# Patient Record
Sex: FEMALE | URBAN - METROPOLITAN AREA
[De-identification: names, ages, dates, MRNs, and addresses within clinical notes are randomized per-mention and may not be internally consistent; named-entity substitution may affect disease eponyms.]

---

## 2020-05-13 LAB
ANTIBODY SCREEN, EXTERNAL: NORMAL
CHLAMYDIA, EXTERNAL: NORMAL
HBSAG, EXTERNAL: NEGATIVE
HIV, EXTERNAL: NORMAL
N. GONORRHEA, EXTERNAL: NORMAL
RPR, EXTERNAL: NORMAL
RUBELLA, EXTERNAL: NORMAL
TYPE, ABO & RH, EXTERNAL: NORMAL

## 2020-10-02 LAB — GRBS, EXTERNAL: NORMAL

## 2020-10-21 ENCOUNTER — HOSPITAL ENCOUNTER (OUTPATIENT)
Age: 35
Discharge: HOME | DRG: 540 | End: 2020-10-22
Attending: OBSTETRICS & GYNECOLOGY | Admitting: OBSTETRICS & GYNECOLOGY
Payer: MEDICAID

## 2020-10-21 PROBLEM — Z33.1 IUP (INTRAUTERINE PREGNANCY), INCIDENTAL: Status: ACTIVE | Noted: 2020-10-21

## 2020-10-21 LAB
ALBUMIN SERPL-MCNC: 2.6 G/DL (ref 3.4–5)
ALBUMIN/GLOB SERPL: 0.7 {RATIO} (ref 0.8–1.7)
ALP SERPL-CCNC: 160 U/L (ref 45–117)
ALT SERPL-CCNC: 15 U/L (ref 13–56)
ANION GAP SERPL CALC-SCNC: 8 MMOL/L (ref 3–18)
APPEARANCE UR: ABNORMAL
AST SERPL-CCNC: 13 U/L (ref 10–38)
BILIRUB SERPL-MCNC: 0.3 MG/DL (ref 0.2–1)
BILIRUB UR QL: NEGATIVE
BUN SERPL-MCNC: 6 MG/DL (ref 7–18)
BUN/CREAT SERPL: 9 (ref 12–20)
CALCIUM SERPL-MCNC: 9.3 MG/DL (ref 8.5–10.1)
CHLORIDE SERPL-SCNC: 108 MMOL/L (ref 100–111)
CO2 SERPL-SCNC: 23 MMOL/L (ref 21–32)
COLOR UR: ABNORMAL
CREAT SERPL-MCNC: 0.64 MG/DL (ref 0.6–1.3)
CREAT UR-MCNC: 50 MG/DL (ref 30–125)
ERYTHROCYTE [DISTWIDTH] IN BLOOD BY AUTOMATED COUNT: 15.1 % (ref 11.6–14.5)
GLOBULIN SER CALC-MCNC: 3.9 G/DL (ref 2–4)
GLUCOSE SERPL-MCNC: 111 MG/DL (ref 74–99)
GLUCOSE UR QL STRIP.AUTO: NEGATIVE MG/DL
HCT VFR BLD AUTO: 36.7 % (ref 35–45)
HGB BLD-MCNC: 12.1 G/DL (ref 12–16)
KETONES UR-MCNC: NEGATIVE MG/DL
LEUKOCYTE ESTERASE UR QL STRIP: ABNORMAL
MCH RBC QN AUTO: 25.7 PG (ref 24–34)
MCHC RBC AUTO-ENTMCNC: 33 G/DL (ref 31–37)
MCV RBC AUTO: 78.1 FL (ref 74–97)
NITRITE UR QL: NEGATIVE
PH UR: 6 [PH] (ref 5–9)
PLATELET # BLD AUTO: 349 K/UL (ref 135–420)
PMV BLD AUTO: 10.5 FL (ref 9.2–11.8)
POTASSIUM SERPL-SCNC: 4 MMOL/L (ref 3.5–5.5)
PROT SERPL-MCNC: 6.5 G/DL (ref 6.4–8.2)
PROT UR QL: NEGATIVE MG/DL
PROT UR-MCNC: 8 MG/DL
PROT/CREAT UR-RTO: 0.2
RBC # BLD AUTO: 4.7 M/UL (ref 4.2–5.3)
RBC # UR STRIP: ABNORMAL /UL
SERVICE CMNT-IMP: ABNORMAL
SODIUM SERPL-SCNC: 139 MMOL/L (ref 136–145)
SP GR UR: 1.01 (ref 1–1.02)
URATE SERPL-MCNC: 3.6 MG/DL (ref 2.6–7.2)
UROBILINOGEN UR QL: 1 EU/DL (ref 0.2–1)
WBC # BLD AUTO: 13.6 K/UL (ref 4.6–13.2)

## 2020-10-21 PROCEDURE — 83615 LACTATE (LD) (LDH) ENZYME: CPT

## 2020-10-21 PROCEDURE — 96360 HYDRATION IV INFUSION INIT: CPT

## 2020-10-21 PROCEDURE — 80053 COMPREHEN METABOLIC PANEL: CPT

## 2020-10-21 PROCEDURE — 85027 COMPLETE CBC AUTOMATED: CPT

## 2020-10-21 PROCEDURE — 74011250636 HC RX REV CODE- 250/636: Performed by: MIDWIFE

## 2020-10-21 PROCEDURE — 99285 EMERGENCY DEPT VISIT HI MDM: CPT

## 2020-10-21 PROCEDURE — 81003 URINALYSIS AUTO W/O SCOPE: CPT

## 2020-10-21 PROCEDURE — 59025 FETAL NON-STRESS TEST: CPT

## 2020-10-21 PROCEDURE — 96361 HYDRATE IV INFUSION ADD-ON: CPT

## 2020-10-21 PROCEDURE — 84550 ASSAY OF BLOOD/URIC ACID: CPT

## 2020-10-21 PROCEDURE — 84156 ASSAY OF PROTEIN URINE: CPT

## 2020-10-21 RX ORDER — MELATONIN
1000 DAILY
COMMUNITY

## 2020-10-21 RX ORDER — LANOLIN ALCOHOL/MO/W.PET/CERES
325 CREAM (GRAM) TOPICAL
COMMUNITY

## 2020-10-21 RX ORDER — SODIUM CHLORIDE, SODIUM LACTATE, POTASSIUM CHLORIDE, CALCIUM CHLORIDE 600; 310; 30; 20 MG/100ML; MG/100ML; MG/100ML; MG/100ML
125 INJECTION, SOLUTION INTRAVENOUS CONTINUOUS
Status: DISCONTINUED | OUTPATIENT
Start: 2020-10-21 | End: 2020-10-22 | Stop reason: HOSPADM

## 2020-10-21 RX ADMIN — SODIUM CHLORIDE, SODIUM LACTATE, POTASSIUM CHLORIDE, AND CALCIUM CHLORIDE 125 ML/HR: 600; 310; 30; 20 INJECTION, SOLUTION INTRAVENOUS at 23:31

## 2020-10-21 RX ADMIN — SODIUM CHLORIDE, SODIUM LACTATE, POTASSIUM CHLORIDE, AND CALCIUM CHLORIDE 500 ML: 600; 310; 30; 20 INJECTION, SOLUTION INTRAVENOUS at 22:52

## 2020-10-22 ENCOUNTER — HOSPITAL ENCOUNTER (INPATIENT)
Age: 35
LOS: 3 days | Discharge: HOME | DRG: 540 | End: 2020-10-25
Attending: OBSTETRICS & GYNECOLOGY | Admitting: OBSTETRICS & GYNECOLOGY
Payer: MEDICAID

## 2020-10-22 ENCOUNTER — ANESTHESIA EVENT (OUTPATIENT)
Dept: LABOR AND DELIVERY | Age: 35
DRG: 540 | End: 2020-10-22
Payer: MEDICAID

## 2020-10-22 ENCOUNTER — ANESTHESIA EVENT (OUTPATIENT)
Dept: LABOR AND DELIVERY | Age: 35
End: 2020-10-22

## 2020-10-22 ENCOUNTER — ANESTHESIA (OUTPATIENT)
Dept: LABOR AND DELIVERY | Age: 35
DRG: 540 | End: 2020-10-22
Payer: MEDICAID

## 2020-10-22 VITALS
HEART RATE: 99 BPM | WEIGHT: 210 LBS | DIASTOLIC BLOOD PRESSURE: 82 MMHG | BODY MASS INDEX: 35.85 KG/M2 | RESPIRATION RATE: 16 BRPM | OXYGEN SATURATION: 100 % | HEIGHT: 64 IN | TEMPERATURE: 99.4 F | SYSTOLIC BLOOD PRESSURE: 146 MMHG

## 2020-10-22 DIAGNOSIS — Z98.891 S/P REPEAT LOW TRANSVERSE C-SECTION: Primary | ICD-10-CM

## 2020-10-22 PROBLEM — Z34.90 PREGNANCY: Status: ACTIVE | Noted: 2020-10-22

## 2020-10-22 PROBLEM — Z3A.38 38 WEEKS GESTATION OF PREGNANCY: Status: ACTIVE | Noted: 2020-10-22

## 2020-10-22 LAB
ABO + RH BLD: NORMAL
APPEARANCE UR: CLEAR
BASOPHILS # BLD: 0 K/UL (ref 0–0.1)
BASOPHILS NFR BLD: 0 % (ref 0–2)
BILIRUB UR QL: NEGATIVE
BLOOD GROUP ANTIBODIES SERPL: NORMAL
COLOR UR: YELLOW
DIFFERENTIAL METHOD BLD: ABNORMAL
EOSINOPHIL # BLD: 0.1 K/UL (ref 0–0.4)
EOSINOPHIL NFR BLD: 1 % (ref 0–5)
ERYTHROCYTE [DISTWIDTH] IN BLOOD BY AUTOMATED COUNT: 15 % (ref 11.6–14.5)
GLUCOSE UR QL STRIP.AUTO: NEGATIVE MG/DL
HCT VFR BLD AUTO: 36.8 % (ref 35–45)
HGB BLD-MCNC: 12.1 G/DL (ref 12–16)
KETONES UR-MCNC: NEGATIVE MG/DL
LDH SERPL L TO P-CCNC: 181 U/L (ref 81–234)
LEUKOCYTE ESTERASE UR QL STRIP: ABNORMAL
LYMPHOCYTES # BLD: 1.9 K/UL (ref 0.9–3.6)
LYMPHOCYTES NFR BLD: 14 % (ref 21–52)
MCH RBC QN AUTO: 26 PG (ref 24–34)
MCHC RBC AUTO-ENTMCNC: 32.9 G/DL (ref 31–37)
MCV RBC AUTO: 79 FL (ref 74–97)
MONOCYTES # BLD: 0.9 K/UL (ref 0.05–1.2)
MONOCYTES NFR BLD: 6 % (ref 3–10)
NEUTS SEG # BLD: 11.1 K/UL (ref 1.8–8)
NEUTS SEG NFR BLD: 79 % (ref 40–73)
NITRITE UR QL: NEGATIVE
PH UR: 6.5 [PH] (ref 5–9)
PLATELET # BLD AUTO: 331 K/UL (ref 135–420)
PMV BLD AUTO: 10.3 FL (ref 9.2–11.8)
PROT UR QL: NEGATIVE MG/DL
RBC # BLD AUTO: 4.66 M/UL (ref 4.2–5.3)
RBC # UR STRIP: ABNORMAL /UL
SERVICE CMNT-IMP: ABNORMAL
SP GR UR: 1.01 (ref 1–1.02)
SPECIMEN EXP DATE BLD: NORMAL
UROBILINOGEN UR QL: 0.2 EU/DL (ref 0.2–1)
WBC # BLD AUTO: 13.9 K/UL (ref 4.6–13.2)

## 2020-10-22 PROCEDURE — 77030011265 HC ELECTRD BLD HEX COVD -A: Performed by: OBSTETRICS & GYNECOLOGY

## 2020-10-22 PROCEDURE — 75410000003 HC RECOV DEL/VAG/CSECN EA 0.5 HR

## 2020-10-22 PROCEDURE — 75410000003 HC RECOV DEL/VAG/CSECN EA 0.5 HR: Performed by: OBSTETRICS & GYNECOLOGY

## 2020-10-22 PROCEDURE — 76010000392 HC C SECN EA ADDL 0.5 HR: Performed by: OBSTETRICS & GYNECOLOGY

## 2020-10-22 PROCEDURE — 96361 HYDRATE IV INFUSION ADD-ON: CPT

## 2020-10-22 PROCEDURE — 65270000029 HC RM PRIVATE

## 2020-10-22 PROCEDURE — 99283 EMERGENCY DEPT VISIT LOW MDM: CPT

## 2020-10-22 PROCEDURE — 86900 BLOOD TYPING SEROLOGIC ABO: CPT

## 2020-10-22 PROCEDURE — 74011250636 HC RX REV CODE- 250/636: Performed by: OBSTETRICS & GYNECOLOGY

## 2020-10-22 PROCEDURE — 77030002916 HC SUT ETHLN J&J -A: Performed by: OBSTETRICS & GYNECOLOGY

## 2020-10-22 PROCEDURE — 77030018809 HC RETRCTR ALXSO DISP AMR -B: Performed by: OBSTETRICS & GYNECOLOGY

## 2020-10-22 PROCEDURE — 74011250636 HC RX REV CODE- 250/636: Performed by: ADVANCED PRACTICE MIDWIFE

## 2020-10-22 PROCEDURE — 74011250636 HC RX REV CODE- 250/636: Performed by: NURSE ANESTHETIST, CERTIFIED REGISTERED

## 2020-10-22 PROCEDURE — 2709999900 HC NON-CHARGEABLE SUPPLY: Performed by: OBSTETRICS & GYNECOLOGY

## 2020-10-22 PROCEDURE — 77030040361 HC SLV COMPR DVT MDII -B

## 2020-10-22 PROCEDURE — 99285 EMERGENCY DEPT VISIT HI MDM: CPT

## 2020-10-22 PROCEDURE — 74011000250 HC RX REV CODE- 250: Performed by: NURSE ANESTHETIST, CERTIFIED REGISTERED

## 2020-10-22 PROCEDURE — 77030002933 HC SUT MCRYL J&J -A: Performed by: OBSTETRICS & GYNECOLOGY

## 2020-10-22 PROCEDURE — 77030014144 HC TY SPN ANES BBMI -B: Performed by: NURSE ANESTHETIST, CERTIFIED REGISTERED

## 2020-10-22 PROCEDURE — 77030040830 HC CATH URETH FOL MDII -A

## 2020-10-22 PROCEDURE — 59025 FETAL NON-STRESS TEST: CPT

## 2020-10-22 PROCEDURE — 96360 HYDRATION IV INFUSION INIT: CPT

## 2020-10-22 PROCEDURE — 77030040361 HC SLV COMPR DVT MDII -B: Performed by: OBSTETRICS & GYNECOLOGY

## 2020-10-22 PROCEDURE — 77030031139 HC SUT VCRL2 J&J -A: Performed by: OBSTETRICS & GYNECOLOGY

## 2020-10-22 PROCEDURE — 76060000033 HC ANESTHESIA 1 TO 1.5 HR: Performed by: OBSTETRICS & GYNECOLOGY

## 2020-10-22 PROCEDURE — 81003 URINALYSIS AUTO W/O SCOPE: CPT

## 2020-10-22 PROCEDURE — 74011250637 HC RX REV CODE- 250/637

## 2020-10-22 PROCEDURE — 76010000391 HC C SECN FIRST 1 HR: Performed by: OBSTETRICS & GYNECOLOGY

## 2020-10-22 PROCEDURE — 85025 COMPLETE CBC W/AUTO DIFF WBC: CPT

## 2020-10-22 RX ORDER — NALOXONE HYDROCHLORIDE 0.4 MG/ML
0.1 INJECTION, SOLUTION INTRAMUSCULAR; INTRAVENOUS; SUBCUTANEOUS AS NEEDED
Status: DISCONTINUED | OUTPATIENT
Start: 2020-10-22 | End: 2020-10-25 | Stop reason: HOSPADM

## 2020-10-22 RX ORDER — HYDROMORPHONE HCL IN WATER/PF 1 MG/ML
SYRINGE (ML) INJECTION CONTINUOUS
Status: DISCONTINUED | OUTPATIENT
Start: 2020-10-22 | End: 2020-10-22

## 2020-10-22 RX ORDER — SODIUM CHLORIDE 0.9 % (FLUSH) 0.9 %
5-40 SYRINGE (ML) INJECTION AS NEEDED
Status: DISCONTINUED | OUTPATIENT
Start: 2020-10-22 | End: 2020-10-24 | Stop reason: SDUPTHER

## 2020-10-22 RX ORDER — OXYTOCIN/RINGER'S LACTATE 30/500 ML
10 PLASTIC BAG, INJECTION (ML) INTRAVENOUS AS NEEDED
Status: DISCONTINUED | OUTPATIENT
Start: 2020-10-22 | End: 2020-10-25 | Stop reason: HOSPADM

## 2020-10-22 RX ORDER — ACETAMINOPHEN 325 MG/1
650 TABLET ORAL
Status: DISCONTINUED | OUTPATIENT
Start: 2020-10-22 | End: 2020-10-22 | Stop reason: SDUPTHER

## 2020-10-22 RX ORDER — TRISODIUM CITRATE DIHYDRATE AND CITRIC ACID MONOHYDRATE 500; 334 MG/5ML; MG/5ML
SOLUTION ORAL
Status: COMPLETED
Start: 2020-10-22 | End: 2020-10-22

## 2020-10-22 RX ORDER — ONDANSETRON 2 MG/ML
INJECTION INTRAMUSCULAR; INTRAVENOUS AS NEEDED
Status: DISCONTINUED | OUTPATIENT
Start: 2020-10-22 | End: 2020-10-22 | Stop reason: HOSPADM

## 2020-10-22 RX ORDER — BUPIVACAINE HYDROCHLORIDE 7.5 MG/ML
INJECTION, SOLUTION INTRASPINAL AS NEEDED
Status: DISCONTINUED | OUTPATIENT
Start: 2020-10-22 | End: 2020-10-22 | Stop reason: HOSPADM

## 2020-10-22 RX ORDER — FACIAL-BODY WIPES
10 EACH TOPICAL DAILY PRN
Status: DISCONTINUED | OUTPATIENT
Start: 2020-10-22 | End: 2020-10-25 | Stop reason: HOSPADM

## 2020-10-22 RX ORDER — ACETAMINOPHEN 325 MG/1
650 TABLET ORAL
Status: DISCONTINUED | OUTPATIENT
Start: 2020-10-22 | End: 2020-10-25 | Stop reason: HOSPADM

## 2020-10-22 RX ORDER — SIMETHICONE 80 MG
80 TABLET,CHEWABLE ORAL
Status: DISCONTINUED | OUTPATIENT
Start: 2020-10-22 | End: 2020-10-25 | Stop reason: HOSPADM

## 2020-10-22 RX ORDER — PROMETHAZINE HYDROCHLORIDE 25 MG/ML
25 INJECTION, SOLUTION INTRAMUSCULAR; INTRAVENOUS
Status: DISCONTINUED | OUTPATIENT
Start: 2020-10-22 | End: 2020-10-25 | Stop reason: HOSPADM

## 2020-10-22 RX ORDER — TRISODIUM CITRATE DIHYDRATE AND CITRIC ACID MONOHYDRATE 500; 334 MG/5ML; MG/5ML
30 SOLUTION ORAL
Status: DISCONTINUED | OUTPATIENT
Start: 2020-10-22 | End: 2020-10-22 | Stop reason: HOSPADM

## 2020-10-22 RX ORDER — TRISODIUM CITRATE DIHYDRATE AND CITRIC ACID MONOHYDRATE 500; 334 MG/5ML; MG/5ML
30 SOLUTION ORAL
Status: COMPLETED | OUTPATIENT
Start: 2020-10-22 | End: 2020-10-22

## 2020-10-22 RX ORDER — OXYTOCIN/0.9 % SODIUM CHLORIDE 30/500 ML
95 PLASTIC BAG, INJECTION (ML) INTRAVENOUS AS NEEDED
Status: COMPLETED | OUTPATIENT
Start: 2020-10-22 | End: 2020-10-22

## 2020-10-22 RX ORDER — OXYTOCIN/0.9 % SODIUM CHLORIDE 30/500 ML
95 PLASTIC BAG, INJECTION (ML) INTRAVENOUS AS NEEDED
Status: DISCONTINUED | OUTPATIENT
Start: 2020-10-22 | End: 2020-10-25 | Stop reason: HOSPADM

## 2020-10-22 RX ORDER — SODIUM CHLORIDE 0.9 % (FLUSH) 0.9 %
5-40 SYRINGE (ML) INJECTION AS NEEDED
Status: DISCONTINUED | OUTPATIENT
Start: 2020-10-22 | End: 2020-10-22 | Stop reason: HOSPADM

## 2020-10-22 RX ORDER — CEFAZOLIN SODIUM/WATER 2 G/20 ML
2 SYRINGE (ML) INTRAVENOUS ONCE
Status: COMPLETED | OUTPATIENT
Start: 2020-10-22 | End: 2020-10-22

## 2020-10-22 RX ORDER — KETOROLAC TROMETHAMINE 30 MG/ML
30 INJECTION, SOLUTION INTRAMUSCULAR; INTRAVENOUS EVERY 6 HOURS
Status: DISCONTINUED | OUTPATIENT
Start: 2020-10-23 | End: 2020-10-24

## 2020-10-22 RX ORDER — SODIUM CHLORIDE 0.9 % (FLUSH) 0.9 %
5-40 SYRINGE (ML) INJECTION AS NEEDED
Status: DISCONTINUED | OUTPATIENT
Start: 2020-10-22 | End: 2020-10-25 | Stop reason: HOSPADM

## 2020-10-22 RX ORDER — PHENYLEPHRINE HCL IN 0.9% NACL 1 MG/10 ML
SYRINGE (ML) INTRAVENOUS AS NEEDED
Status: DISCONTINUED | OUTPATIENT
Start: 2020-10-22 | End: 2020-10-22 | Stop reason: HOSPADM

## 2020-10-22 RX ORDER — SODIUM CHLORIDE, SODIUM LACTATE, POTASSIUM CHLORIDE, CALCIUM CHLORIDE 600; 310; 30; 20 MG/100ML; MG/100ML; MG/100ML; MG/100ML
125 INJECTION, SOLUTION INTRAVENOUS CONTINUOUS
Status: DISCONTINUED | OUTPATIENT
Start: 2020-10-22 | End: 2020-10-25 | Stop reason: HOSPADM

## 2020-10-22 RX ORDER — ONDANSETRON 2 MG/ML
4 INJECTION INTRAMUSCULAR; INTRAVENOUS
Status: DISCONTINUED | OUTPATIENT
Start: 2020-10-22 | End: 2020-10-25 | Stop reason: HOSPADM

## 2020-10-22 RX ORDER — TRISODIUM CITRATE DIHYDRATE AND CITRIC ACID MONOHYDRATE 500; 334 MG/5ML; MG/5ML
30 SOLUTION ORAL
Status: CANCELLED | OUTPATIENT
Start: 2020-10-22 | End: 2020-10-22

## 2020-10-22 RX ORDER — SODIUM CHLORIDE 0.9 % (FLUSH) 0.9 %
5-40 SYRINGE (ML) INJECTION EVERY 8 HOURS
Status: DISCONTINUED | OUTPATIENT
Start: 2020-10-22 | End: 2020-10-22 | Stop reason: HOSPADM

## 2020-10-22 RX ORDER — SODIUM CHLORIDE 0.9 % (FLUSH) 0.9 %
5-40 SYRINGE (ML) INJECTION EVERY 8 HOURS
Status: DISCONTINUED | OUTPATIENT
Start: 2020-10-22 | End: 2020-10-25 | Stop reason: HOSPADM

## 2020-10-22 RX ORDER — SODIUM CHLORIDE, SODIUM LACTATE, POTASSIUM CHLORIDE, CALCIUM CHLORIDE 600; 310; 30; 20 MG/100ML; MG/100ML; MG/100ML; MG/100ML
125 INJECTION, SOLUTION INTRAVENOUS CONTINUOUS
Status: DISPENSED | OUTPATIENT
Start: 2020-10-22 | End: 2020-10-23

## 2020-10-22 RX ORDER — SODIUM CHLORIDE 0.9 % (FLUSH) 0.9 %
5-40 SYRINGE (ML) INJECTION EVERY 8 HOURS
Status: DISCONTINUED | OUTPATIENT
Start: 2020-10-22 | End: 2020-10-24 | Stop reason: SDUPTHER

## 2020-10-22 RX ORDER — EPHEDRINE SULFATE/0.9% NACL/PF 50 MG/5 ML
SYRINGE (ML) INTRAVENOUS AS NEEDED
Status: DISCONTINUED | OUTPATIENT
Start: 2020-10-22 | End: 2020-10-22 | Stop reason: HOSPADM

## 2020-10-22 RX ORDER — IBUPROFEN 400 MG/1
800 TABLET ORAL
Status: DISCONTINUED | OUTPATIENT
Start: 2020-10-25 | End: 2020-10-24 | Stop reason: SDUPTHER

## 2020-10-22 RX ORDER — LIDOCAINE HYDROCHLORIDE 10 MG/ML
INJECTION INFILTRATION; PERINEURAL AS NEEDED
Status: DISCONTINUED | OUTPATIENT
Start: 2020-10-22 | End: 2020-10-22 | Stop reason: HOSPADM

## 2020-10-22 RX ORDER — ZOLPIDEM TARTRATE 5 MG/1
5 TABLET ORAL
Status: DISCONTINUED | OUTPATIENT
Start: 2020-10-22 | End: 2020-10-25 | Stop reason: HOSPADM

## 2020-10-22 RX ORDER — OXYTOCIN/0.9 % SODIUM CHLORIDE 30/500 ML
PLASTIC BAG, INJECTION (ML) INTRAVENOUS
Status: DISPENSED
Start: 2020-10-22 | End: 2020-10-23

## 2020-10-22 RX ORDER — HYDROMORPHONE HYDROCHLORIDE 1 MG/ML
2 INJECTION, SOLUTION INTRAMUSCULAR; INTRAVENOUS; SUBCUTANEOUS
Status: DISCONTINUED | OUTPATIENT
Start: 2020-10-22 | End: 2020-10-25 | Stop reason: HOSPADM

## 2020-10-22 RX ORDER — HYDROMORPHONE HCL IN WATER/PF 1 MG/ML
SYRINGE (ML) INJECTION
Status: DISCONTINUED | OUTPATIENT
Start: 2020-10-22 | End: 2020-10-25 | Stop reason: HOSPADM

## 2020-10-22 RX ORDER — KETOROLAC TROMETHAMINE 15 MG/ML
INJECTION, SOLUTION INTRAMUSCULAR; INTRAVENOUS AS NEEDED
Status: DISCONTINUED | OUTPATIENT
Start: 2020-10-22 | End: 2020-10-22 | Stop reason: HOSPADM

## 2020-10-22 RX ORDER — HYDROMORPHONE HCL IN WATER/PF 1 MG/ML
SYRINGE (ML) INJECTION CONTINUOUS
Status: DISCONTINUED | OUTPATIENT
Start: 2020-10-22 | End: 2020-10-22 | Stop reason: RX

## 2020-10-22 RX ORDER — DIPHENHYDRAMINE HYDROCHLORIDE 50 MG/ML
25 INJECTION, SOLUTION INTRAMUSCULAR; INTRAVENOUS
Status: DISCONTINUED | OUTPATIENT
Start: 2020-10-22 | End: 2020-10-25 | Stop reason: HOSPADM

## 2020-10-22 RX ORDER — OXYCODONE AND ACETAMINOPHEN 5; 325 MG/1; MG/1
1-2 TABLET ORAL
Status: DISCONTINUED | OUTPATIENT
Start: 2020-10-22 | End: 2020-10-25 | Stop reason: HOSPADM

## 2020-10-22 RX ADMIN — Medication 200 MCG: at 13:28

## 2020-10-22 RX ADMIN — KETOROLAC TROMETHAMINE 30 MG: 15 INJECTION, SOLUTION INTRAMUSCULAR; INTRAVENOUS at 13:41

## 2020-10-22 RX ADMIN — SODIUM CHLORIDE, SODIUM LACTATE, POTASSIUM CHLORIDE, AND CALCIUM CHLORIDE 125 ML/HR: 600; 310; 30; 20 INJECTION, SOLUTION INTRAVENOUS at 09:15

## 2020-10-22 RX ADMIN — SODIUM CITRATE AND CITRIC ACID MONOHYDRATE 30 ML: 500; 334 SOLUTION ORAL at 09:46

## 2020-10-22 RX ADMIN — TRISODIUM CITRATE DIHYDRATE AND CITRIC ACID MONOHYDRATE 30 ML: 500; 334 SOLUTION ORAL at 09:46

## 2020-10-22 RX ADMIN — Medication 5 MG: at 12:59

## 2020-10-22 RX ADMIN — LIDOCAINE HYDROCHLORIDE 3 ML: 10 INJECTION, SOLUTION INFILTRATION; PERINEURAL at 12:53

## 2020-10-22 RX ADMIN — SODIUM CHLORIDE, SODIUM LACTATE, POTASSIUM CHLORIDE, AND CALCIUM CHLORIDE 1000 ML: 600; 310; 30; 20 INJECTION, SOLUTION INTRAVENOUS at 08:20

## 2020-10-22 RX ADMIN — Medication 500 ML/HR: at 13:22

## 2020-10-22 RX ADMIN — ONDANSETRON HYDROCHLORIDE 4 MG: 2 INJECTION INTRAMUSCULAR; INTRAVENOUS at 13:23

## 2020-10-22 RX ADMIN — BUPIVACAINE HYDROCHLORIDE IN DEXTROSE 1.6 ML: 7.5 INJECTION, SOLUTION SUBARACHNOID at 12:53

## 2020-10-22 RX ADMIN — Medication 2 G: at 12:45

## 2020-10-22 RX ADMIN — SODIUM CHLORIDE, SODIUM LACTATE, POTASSIUM CHLORIDE, AND CALCIUM CHLORIDE: 600; 310; 30; 20 INJECTION, SOLUTION INTRAVENOUS at 13:20

## 2020-10-22 RX ADMIN — Medication: at 15:32

## 2020-10-22 NOTE — ANESTHESIA PROCEDURE NOTES
Spinal Block    Start time: 10/22/2020 12:53 PM  Performed by: Yue Ennis MD  Authorized by: Yue Ennis MD     Pre-procedure:   Indications: primary anesthetic  Preanesthetic Checklist: patient identified, risks and benefits discussed, anesthesia consent, site marked, patient being monitored and timeout performed      Spinal Block:   Patient Position:  Seated  Prep Region:  Lumbar  Prep: chlorhexidine and patient draped      Location:  L4-5  Technique:  Single shot        Needle:   Needle Type:  Pencil-tip  Needle Gauge:  25 G  Attempts:  1      Events: CSF confirmed, no blood with aspiration and no paresthesia        Assessment:  Insertion:  Uncomplicated  Patient tolerance:  Patient tolerated the procedure well with no immediate complications  I did not go through the tatoo

## 2020-10-22 NOTE — OP NOTES
Operative Note      Surgeon(s): Jayce Tanner MD  Assistant:   Samreen Fisher  Pre-operative Diagnosis:   Prior  section, Labor, 38 weeks gestation  Post-operative Diagnosis: same as preop diagnosis. Procedure(s) Performed: Repeat Low Transverse  Section                  Anesthesia:  Spinal  Findings: viable male infant weight  3425 g APGARS 7/8. Grossly normal uterus, ovaries, and fallopian tubes  Complications: none   Estimated Blood Loss:   600 ml  Specimens:   none  Implants: none  Procedure:   Patient was taken to the OR after informed consent had been obtained. Spinal anesthesia was administered. She was then placed in a dorsal supine position with a left lateral tilt. She was prepped and draped in the normal sterile fashion. Time out was completed. Attention was turned to the abdomen and an Allis test confirmed adequate anesthesia. A Pfannenstiel skin incision was made 3 cm above the symphysis pubis. The incision was carried down to the fascia. The fascia was scored in the midline. The subcutaneous tissue and fascia were extended bilaterally. The rectus muscle was divided sharply. The peritoneum was identified, elevated and entered sharply. The incision was extended with good visualization of the bladder. The Eyal retractor was inserted. The uterus was scored in the lower uterine segment and then entered in the midline. The uterine incision was extended bilaterally, transversly. The fetal head was flexed, pressure was applied to the abdomen and the fetal head was delivered followed by the body. Meconium stained fluid was noted. The cord was clamped and cut after a one minute delay. The placenta was manually extracted. The uterus was cleared of all clot and debris. The incision was closed with 1 Monocryl in a running fashion. A second imbricating layer of 1 Monocryl was placed. The peritoneum was closed with 3-0 vicryl suture.     The fascia was closed with 0 vicryl in a running fashion. The muscle was also closed with 3-0 vicryl. Once excellent hemostasis was noted, the subcutaneous tissue was closed with 2-0 plain gut suture. The skin was closed with 3-0 Monocryl dissolvable sutures in a subcuticular fashion. Exofin was also applied to the incision. All counts were correct. The mother and child tolerated the procedure well.       Ciaran Roach MD

## 2020-10-22 NOTE — LACTATION NOTE
Attempted feeding at , but baby wouldn't latch. Mom educated on breastfeeding basics--hunger cues, feeding on demand, waking baby if baby sleeps too long between feeds, importance of skin to skin, positioning and latching, risk of pacifier use and supplemental feedings, and importance of rooming in--and use of log sheet. Mom also educated on benefits of breastfeeding for herself and baby. Mom verbalized understanding. No questions at this time.

## 2020-10-22 NOTE — H&P
History & Physical    Name: Jim Schulz MRN: [id number]  SSN: [id number]    YOB: 1985  Age: 30 y.o. Sex: female      Subjective:     Estimated Date of Delivery: 10/30/20  OB History    Para Term  AB Living   2 1 1     1   SAB TAB Ectopic Molar Multiple Live Births             1      # Outcome Date GA Lbr Eduard/2nd Weight Sex Delivery Anes PTL Lv   2 Current            1 Term 2009 40w0d  2.92 kg F CS-LVertical  N DELFINA      Complications: Failure to Progress in First Stage       Ms. Eileen Diego is admitted with pregnancy at 38w6d for  section due to previous  section and labor. Prenatal course was normal. Please see prenatal records for details. Past Medical History:   Diagnosis Date    Anemia     Psychiatric problem      Past Surgical History:   Procedure Laterality Date    HX  SECTION       Social History     Occupational History    Not on file   Tobacco Use    Smoking status: Never Smoker    Smokeless tobacco: Never Used   Substance and Sexual Activity    Alcohol use: Not Currently    Drug use: Never    Sexual activity: Not on file     History reviewed. No pertinent family history. No Known Allergies  Prior to Admission medications    Medication Sig Start Date End Date Taking? Authorizing Provider   PNV Comb #2-Iron-FA-Omega 3 29-1-400 mg cmpk Take 1 Tab by mouth daily. Indications: pregnancy   Yes Provider, Historical   cholecalciferol (Vitamin D3) (1000 Units /25 mcg) tablet Take 1,000 Units by mouth daily. Indications: low vitamin D levels   Yes Provider, Historical   ferrous sulfate (Iron) 325 mg (65 mg iron) tablet Take 325 mg by mouth Daily (before breakfast).  Indications: anemia from inadequate iron   Yes Provider, Historical        Review of Systems    Objective:     Vitals:  Vitals:    10/22/20 0738 10/22/20 0741   BP: 138/88    Pulse: 96    Resp: 14    Temp: 97.9 °F (36.6 °C)    Weight:  95.3 kg (210 lb)   Height:  5' 3.5\" (1.613 m) Physical Exam:  Physical Exam  Cervical Exam: 2-3 cm  Membranes: Intact  Fetal Heart Rate: Reactive    Prenatal Labs:   Lab Results   Component Value Date/Time    Rubella, External Immune 2020    GrBStrep, External neg 10/02/2020    HBsAg, External negative 2020    HIV, External neg 2020    RPR, External NR 2020    Gonorrhea, External neg 2020    Chlamydia, External neg 2020    ABO,Rh B+ 2020         Impression/Plan:     Active Problems:    IUP (intrauterine pregnancy), incidental (10/21/2020)      Pregnancy (10/22/2020)      38 weeks gestation of pregnancy (10/22/2020)         Plan:  Admit for  section. Group B Strep was negative. Discussed the risks of surgery including the risks of bleeding, infection, deep vein thrombosis, and surgical injuries to internal organs including but not limited to the bowels, bladder, rectum, and female reproductive organs. The patient understands the risks; any and all questions were answered to the patient's satisfaction.

## 2020-10-22 NOTE — PROGRESS NOTES
HPI:    Subjective:     Jere Webber, 30 y.o.   at 38w6d presents to L&D with c/o Uterine contractions: Date/time of onset: 10/21/2020 at 1800, Frequency: Every 5 minutes and Intensity: strong. Assessment:  No past medical history on file. No past surgical history on file. No Known Allergies  Prior to Admission medications    Medication Sig Start Date End Date Taking? Authorizing Provider   cholecalciferol (Vitamin D3) (1000 Units /25 mcg) tablet Take 1,000 Units by mouth daily. Indications: low vitamin D levels   Yes Provider, Historical   ferrous sulfate (Iron) 325 mg (65 mg iron) tablet Take 325 mg by mouth Daily (before breakfast). Indications: anemia from inadequate iron   Yes Provider, Historical        Objective:     Vitals:  Vitals:    10/21/20 2243 10/21/20 2248 10/21/20 2253 10/21/20 2300   BP:    128/77   Pulse:       Resp:       Temp:       SpO2: 100% 100% 100%    Weight:       Height:            Physical Exam:  Patient without distress. Heart: Regular rate and rhythm, S1S2 present or without murmur or extra heart sounds  Lung: clear to auscultation throughout lung fields, no wheezes, no rales, no rhonchi and normal respiratory effort  Abdomen: soft, nontender  Fundus: soft and non tender  Perineum: blood absent, amniotic fluid absent  Cervical Exam: 1 cm dilated    0% effaced    -3 station    Lower Extremities:  - Edema 1+  Membranes:  Intact  Fetal Heart Rate: Baseline: 150 per minute  Variability: moderate  Accelerations: yes  Decelerations: none  Uterine contractions: regular, every 4-7 minutes  Cervical exam: unchanged after 2 hours  U/A: Urine dipstick shows positive for leukocytes, red blood cells. Negative for all other components    Assessment/Plan: Uterine contractions, labor ruled out with no cervical change after 2 hours. GHTN, mild range bps through triage stay, [address] [employer] [address] labs WNL.       Discussed pt with Dr. Sander Warren, move repeat c/s to 39+0 (10/23/202) with him at 0730.  Plan: DC home with standard & ER labor precautions. Strict preeclampsia precautions given. Pt to return for repeat c/s at 0530 on 10/23/2020.      Signed By:  Courtney Pak CNM     October 22, 2020

## 2020-10-22 NOTE — PROGRESS NOTES
1936: TRANSFER - IN REPORT:  Verbal report received from ADRIAN Hernandez RN(name) on Dhara Bryan  being received from L&D (unit) for routine progression of care. Report consisted of patients Situation, Background, Assessment and Recommendations(SBAR). Information from the following report(s) SBAR, Kardex, OR Summary, Procedure Summary, MAR and Recent Results was reviewed with the receiving nurse. Opportunity for questions and clarification was provided. 1850: Patient firm at U +1 with small bleeding. Patient c/o of 7/10 pain in her abdomen. Encouraged to use her PCA pump. 1915: Bedside and Verbal shift change report given to WILIAN Anthony RN (oncoming nurse) by Nelson Harmon RN (offgoing nurse). Report included the following information SBAR, Kardex, Procedure Summary, Intake/Output, MAR and Recent Results.

## 2020-10-22 NOTE — PROGRESS NOTES
5396- Patient presents to labor and delivery      0804- YULIYA Cantor CNM at bedside    0815- SVE- 2-3cm  YULIYA Cantor CNM    6012- C-Section called

## 2020-10-22 NOTE — PROGRESS NOTES
0930 Bedside and verbal SBAR report received on patient from Anthony Shelton RN. Assumed care of patient at this time. Patient prepped and ready for repeat  section. 0484 31 29 02 Patient in OR.  1300 FHTs 120s  36 LTCS delivery of viable male infant. ELLIS Espana RN at [facility] for care of . [address] [place], NNP called to bedside for thick meconium. 1402 Patient out of OR.   1405 Patient in [facility] for recovery. 1834 TRANSFER - OUT REPORT:    Verbal report given to Koko Fragoso RN(name) on Nazia Benites  being transferred to Mother Baby(unit) for routine progression of care       Report consisted of patients Situation, Background, Assessment and   Recommendations(SBAR). Information from the following report(s) SBAR, Kardex, OR Summary, Intake/Output and MAR was reviewed with the receiving nurse. Lines:   Peripheral IV 10/22/20 Right Hand (Active)        Opportunity for questions and clarification was provided.       Patient transported with:   Registered Nurse

## 2020-10-22 NOTE — LACTATION NOTE
This note was copied from a baby's chart. 1640 LC asked to come to room. RN stated that she had provided a NS due to the larger sized nipple of mom.  was attempting to feed, a few sucks occurred.  noted to be very gaggy at this time. Discussed DOL and post C/Section behaviors. LC attempted to get  to suck on finger,  unable to suck without gagging at this time. Will return. 1925 attempted to get  latched.  is still acting like he is trying to spit up or gag. Taught mom hand expression. Spoon fed approx 8 drops of colostrum from the spoon. Placed  skin to skin with mom at this time. encouraged to continue attempting to feed. Mom verbalized understanding.

## 2020-10-22 NOTE — ANESTHESIA PREPROCEDURE EVALUATION
Relevant Problems   No relevant active problems       Anesthetic History   No history of anesthetic complications            Review of Systems / Medical History  Patient summary reviewed, nursing notes reviewed and pertinent labs reviewed    Pulmonary  Within defined limits                 Neuro/Psych   Within defined limits           Cardiovascular                  Exercise tolerance: >4 METS     GI/Hepatic/Renal  Within defined limits              Endo/Other        Morbid obesity     Other Findings            Physical Exam    Airway  Mallampati: III  TM Distance: 4 - 6 cm  Neck ROM: normal range of motion   Mouth opening: Normal     Cardiovascular               Dental         Pulmonary                 Abdominal  GI exam deferred       Other Findings            Anesthetic Plan    ASA: 2, emergent  Anesthesia type: spinal            Anesthetic plan and risks discussed with: Patient      Patient said she had intense facial itching from last csxn duramorph. I spoke to surgeon with suggestion to use an iv pain med immediately following to avoid lengthy spinal duramorph.

## 2020-10-22 NOTE — PROGRESS NOTES
Problem: Patient Education: Go to Patient Education Activity  Goal: Patient/Family Education  Outcome: Progressing Towards Goal     Problem:  Delivery: Day of Delivery  Goal: Off Pathway (Use only if patient is Off Pathway)  Outcome: Progressing Towards Goal  Goal: Activity/Safety  Outcome: Progressing Towards Goal  Goal: Consults, if ordered  Outcome: Progressing Towards Goal  Goal: Diagnostic Test/Procedures  Outcome: Progressing Towards Goal  Goal: Nutrition/Diet  Outcome: Progressing Towards Goal  Goal: Discharge Planning  Outcome: Progressing Towards Goal  Goal: Medications  Outcome: Progressing Towards Goal  Goal: Respiratory  Outcome: Progressing Towards Goal  Goal: Treatments/Interventions/Procedures  Outcome: Progressing Towards Goal  Goal: Psychosocial  Outcome: Progressing Towards Goal  Goal: *Vital signs within defined limits  Outcome: Progressing Towards Goal  Goal: *Labs within defined limits  Outcome: Progressing Towards Goal  Goal: *Hemodynamically stable  Outcome: Progressing Towards Goal  Goal: *Optimal pain control at patient's stated goal  Outcome: Progressing Towards Goal  Goal: *Participates in infant care  Outcome: Progressing Towards Goal  Goal: *Demonstrates progressive activity  Outcome: Progressing Towards Goal  Goal: *Tolerating diet  Outcome: Progressing Towards Goal     Problem: Pain  Goal: *Control of Pain  Outcome: Progressing Towards Goal     Problem: Patient Education: Go to Patient Education Activity  Goal: Patient/Family Education  Outcome: Progressing Towards Goal

## 2020-10-22 NOTE — ROUTINE PROCESS
Received ambulatory to L&D this  at 38 weeks 5 days EGA with complaints of lower abdominal pain since this afternoon. Reports good fetal movement, denies vaginal bleeding or leaking of fluid. Denies headache, blurred vision or epigastric pain. Urine specimen collected. EFM/[place] applied.  ADRIAN Juarez at bedside for exam. SVE /-3. 
 
2210 IV initiated and labs sent. 2345 Repeat SVE unchanged. 0005 ADRIAN Juarez reported to Dr. Raphael Null, orders noted to set for repeat C/S on Oct. 22. Discussed with patient and , both are in agreement. 0145 Reactive NST. FHR tracing reviewed per ADRIAN Juarez. Orders noted to discharge home with instructions to return  at 0500 for repeat C/S. 
 
0150 Verbal and written discharge instructions given to patient and . Understanding verbalized. 3255 Discharged ambulatory in good condition to go home with .

## 2020-10-22 NOTE — ANESTHESIA PREPROCEDURE EVALUATION
Relevant Problems   No relevant active problems       Anesthetic History   No history of anesthetic complications            Review of Systems / Medical History  Patient summary reviewed, nursing notes reviewed and pertinent labs reviewed    Pulmonary  Within defined limits                 Neuro/Psych   Within defined limits           Cardiovascular                  Exercise tolerance: >4 METS     GI/Hepatic/Renal  Within defined limits              Endo/Other  Within defined limits           Other Findings              Physical Exam    Airway  Mallampati: II  TM Distance: 4 - 6 cm  Neck ROM: normal range of motion   Mouth opening: Normal     Cardiovascular               Dental  No notable dental hx       Pulmonary                 Abdominal  GI exam deferred       Other Findings            Anesthetic Plan    ASA: 2, emergent  Anesthesia type: spinal          Induction: Intravenous  Anesthetic plan and risks discussed with: Patient      Risks and benefits of epidural include but not limited to a headache (with risk of repair requiring blood patch), backache, bleeding, infection, nerve injury, paralysis, failure with need to replace, aand hemodynamic changes. She was scheduled for csxn tomorrow. Patient said she had intense itching from last surgery and we can have surgeon order pump for post op pain.

## 2020-10-23 LAB
HCT VFR BLD AUTO: 34.9 % (ref 35–45)
HGB BLD-MCNC: 11.4 G/DL (ref 12–16)

## 2020-10-23 PROCEDURE — 74011250636 HC RX REV CODE- 250/636: Performed by: ANESTHESIOLOGY

## 2020-10-23 PROCEDURE — 74011250636 HC RX REV CODE- 250/636: Performed by: OBSTETRICS & GYNECOLOGY

## 2020-10-23 PROCEDURE — 36415 COLL VENOUS BLD VENIPUNCTURE: CPT

## 2020-10-23 PROCEDURE — 85018 HEMOGLOBIN: CPT

## 2020-10-23 PROCEDURE — 85014 HEMATOCRIT: CPT

## 2020-10-23 PROCEDURE — 65270000029 HC RM PRIVATE

## 2020-10-23 RX ORDER — OXYCODONE AND ACETAMINOPHEN 5; 325 MG/1; MG/1
1-2 TABLET ORAL
Qty: 20 TAB | Refills: 0 | Status: SHIPPED | OUTPATIENT
Start: 2020-10-23 | End: 2020-10-26

## 2020-10-23 RX ORDER — IBUPROFEN 800 MG/1
800 TABLET ORAL
Qty: 60 TAB | Refills: 1 | Status: SHIPPED | OUTPATIENT
Start: 2020-10-25

## 2020-10-23 RX ADMIN — DIPHENHYDRAMINE HYDROCHLORIDE 25 MG: 50 INJECTION, SOLUTION INTRAMUSCULAR; INTRAVENOUS at 04:27

## 2020-10-23 RX ADMIN — DIPHENHYDRAMINE HYDROCHLORIDE 25 MG: 50 INJECTION, SOLUTION INTRAMUSCULAR; INTRAVENOUS at 09:01

## 2020-10-23 RX ADMIN — KETOROLAC TROMETHAMINE 30 MG: 30 INJECTION, SOLUTION INTRAMUSCULAR at 00:20

## 2020-10-23 RX ADMIN — KETOROLAC TROMETHAMINE 30 MG: 30 INJECTION, SOLUTION INTRAMUSCULAR at 12:10

## 2020-10-23 RX ADMIN — DIPHENHYDRAMINE HYDROCHLORIDE 25 MG: 50 INJECTION, SOLUTION INTRAMUSCULAR; INTRAVENOUS at 19:57

## 2020-10-23 RX ADMIN — KETOROLAC TROMETHAMINE 30 MG: 30 INJECTION, SOLUTION INTRAMUSCULAR at 19:57

## 2020-10-23 RX ADMIN — KETOROLAC TROMETHAMINE 30 MG: 30 INJECTION, SOLUTION INTRAMUSCULAR at 06:05

## 2020-10-23 NOTE — PROGRESS NOTES
Problem: Patient Education: Go to Patient Education Activity  Goal: Patient/Family Education  Outcome: Progressing Towards Goal     Problem: Pain  Goal: *Control of Pain  Outcome: Progressing Towards Goal     Problem: Falls - Risk of  Goal: *Absence of Falls  Description: Document Roya Mercedes Fall Risk and appropriate interventions in the flowsheet.   Outcome: Progressing Towards Goal  Note: Fall Risk Interventions:  Mobility Interventions: Patient to call before getting OOB         Medication Interventions: Patient to call before getting OOB                   Problem:  Delivery: Postpartum Day 1  Goal: Activity/Safety  Outcome: Progressing Towards Goal  Goal: Consults, if ordered  Outcome: Progressing Towards Goal  Goal: Diagnostic Test/Procedures  Outcome: Progressing Towards Goal  Goal: Nutrition/Diet  Outcome: Progressing Towards Goal  Goal: Discharge Planning  Outcome: Progressing Towards Goal  Goal: Medications  Outcome: Progressing Towards Goal  Goal: Respiratory  Outcome: Progressing Towards Goal  Goal: Treatments/Interventions/Procedures  Outcome: Progressing Towards Goal  Goal: Psychosocial  Outcome: Progressing Towards Goal  Goal: *Vital signs within defined limits  Outcome: Progressing Towards Goal  Goal: *Labs within defined limits  Outcome: Progressing Towards Goal  Goal: *Hemodynamically stable  Outcome: Progressing Towards Goal  Goal: *Optimal pain control at patient's stated goal  Outcome: Progressing Towards Goal  Goal: *Participates in infant care  Outcome: Progressing Towards Goal  Goal: *Demonstrates progressive activity  Outcome: Progressing Towards Goal  Goal: *Tolerating diet  Outcome: Progressing Towards Goal  Goal: *Performs self perineal care  Outcome: Progressing Towards Goal

## 2020-10-23 NOTE — PROGRESS NOTES
Pt doing well. Moving all extremities well. No Headaches. No further follow up. Complained of swelling and tingling in her L hand. Unlikely to be a result of our anesthesia, although possible BP cuff related.

## 2020-10-23 NOTE — LACTATION NOTE
This note was copied from a baby's chart. 36  is being held my mom at this time. Discussed DOL expectations and behaviors. Breastfeeding discharge teaching completed to include feeding on demand, foremilk and hindmilk importance, engorgement, mastitis, clogged ducts, pumping, breastmilk storage, and returning to work. Information given about unit and office phone numbers and encouraged mom to reach out if concerns arise, but that  [facility] would be calling her in the next few days to follow up on breastfeeding. Mom verbalized understanding and no questions at this time.

## 2020-10-23 NOTE — ANESTHESIA POSTPROCEDURE EVALUATION
Procedure(s):   SECTION. spinal    Anesthesia Post Evaluation        Comments: Post-Anesthesia Evaluation and Assessment    Cardiovascular Function/Vital Signs  /79 (BP 1 Location: Left arm, BP Patient Position: Head of bed elevated (Comment degrees))   Pulse 89   Temp 36.7 °C (98.1 °F)   Resp 18   Ht 5' 3.5\" (1.613 m)   Wt 95.3 kg (210 lb)   SpO2 100%   Breastfeeding Unknown   BMI 36.62 kg/m²     Patient is status post Procedure(s):   SECTION. Nausea/Vomiting: Controlled. Postoperative hydration reviewed and adequate. Pain:  Pain Scale 1: Numeric (0 - 10) (10/22/20 1933)  Pain Intensity 1: 3 (10/22/20 1933)   Managed. Neurological Status:   Neuro (WDL): Within Defined Limits (10/22/20 0738)   At baseline. Mental Status and Level of Consciousness: Arousable. Pulmonary Status:   O2 Device: Room air (10/22/20 1407)   Adequate oxygenation and airway patent. Complications related to anesthesia: None    Post-anesthesia assessment completed. No concerns. Patient has met all discharge requirements.     Signed By: Yolanda March MD    2020                   INITIAL Post-op Vital signs:   Vitals Value Taken Time   /70 10/22/2020  3:01 PM   Temp 36.4 °C (97.5 °F) 10/22/2020  2:07 PM   Pulse 86 10/22/2020  3:01 PM   Resp 19 10/22/2020  2:07 PM   SpO2 100 % 10/22/2020  3:05 PM

## 2020-10-23 NOTE — PROGRESS NOTES
1920: Bedside and Verbal shift change report given to WILIAN Anthony RN (oncoming nurse) by Arjun Conner RN (offgoing nurse). Report included the following information SBAR, Kardex, OR Summary, Procedure Summary, Intake/Output, MAR and Recent Results. 1950- Verified and cleared PCA pump with Simone Coats RN.    2100- Assessment performed without complications. VSS. Pt in no apparent distress or discomfort. 2250- Rounded on pt, laying comfortably in bed. Pt requested cranberry juice and a popsicle. No other needs expressed. 0020- Scheduled Toradol given without complications. Pt PCA pump reading communication error and channel error. Nursing supervisor contacted by Chapincito Ochoa RN to obtain new PCA pump. 0050- Pt appears drowsy and has difficulty keeping eyes open. Pt can correctly answer questions but not quickly. William Cueto CNM contacted regarding PCA dosing. 0120- Rounded on pt, laying comfortably in bed. Pt visible less drowsy. Pt can still answer questions correctly. Pt denies nausea, dizziness, or pain. 0130- No messages received from THANH regarding PCA dosage and patient orientation. Second message sent to William Cueto CNM through [employer]. 0200- Rounded on pt, laying comfortably in bed. Pt alert and oriented x4. Huff catheter emptied for 1550mL or yellow/straw urine. 0240- Huff catheter removed by Juvencio Rea CNA without complications. Pt ambulated to and from bathroom with assistance. Pt alert and oriented x4 and can answer questions coherently in a timely manner. 0330- Rounded on pt, laying comfortably in bed. No needs expressed at this time. Bed in lowest position, call bell in reach. 0425- Pt complains of persistent itching. Benadryl given without complications. Pt states feelings of fatigued but is otherwise alert and oriented x4. Will continue to monitor. 0165- Rounded on pt, laying comfortably in bed. No needs expressed at this time.  Bed in lowest position, call bell in reach.    0615- Rounded on pt, laying comfortably in bed holding infant. Pt alert and oriented x4. No needs expressed at this time. 0630- Received call from Scot Brittle, CNM about patient PCA pump. DENISEM stated the PCA can be discontinued at any time. 0740- Bedside and Verbal shift change report given to Olimpia Harris RN (oncoming nurse) by Elizabeth Newton RN (offgoing nurse). Report included the following information SBAR, Intake/Output, MAR and Recent Results.

## 2020-10-23 NOTE — PROGRESS NOTES
Problem: Patient Education: Go to Patient Education Activity  Goal: Patient/Family Education  Outcome: Progressing Towards Goal     Problem:  Delivery: Day of Delivery  Goal: Off Pathway (Use only if patient is Off Pathway)  Outcome: Progressing Towards Goal  Goal: Activity/Safety  Outcome: Progressing Towards Goal  Goal: Consults, if ordered  Outcome: Progressing Towards Goal  Goal: Diagnostic Test/Procedures  Outcome: Progressing Towards Goal  Goal: Nutrition/Diet  Outcome: Progressing Towards Goal  Goal: Discharge Planning  Outcome: Progressing Towards Goal  Goal: Medications  Outcome: Progressing Towards Goal  Goal: Respiratory  Outcome: Progressing Towards Goal  Goal: Treatments/Interventions/Procedures  Outcome: Progressing Towards Goal  Goal: Psychosocial  Outcome: Progressing Towards Goal  Goal: *Vital signs within defined limits  Outcome: Progressing Towards Goal  Goal: *Labs within defined limits  Outcome: Progressing Towards Goal  Goal: *Hemodynamically stable  Outcome: Progressing Towards Goal  Goal: *Optimal pain control at patient's stated goal  Outcome: Progressing Towards Goal  Goal: *Participates in infant care  Outcome: Progressing Towards Goal  Goal: *Demonstrates progressive activity  Outcome: Progressing Towards Goal  Goal: *Tolerating diet  Outcome: Progressing Towards Goal     Problem: Pain  Goal: *Control of Pain  Outcome: Progressing Towards Goal     Problem: Patient Education: Go to Patient Education Activity  Goal: Patient/Family Education  Outcome: Progressing Towards Goal     Problem: Falls - Risk of  Goal: *Absence of Falls  Description: Document Maritza Fall Risk and appropriate interventions in the flowsheet.   Outcome: Progressing Towards Goal  Note: Fall Risk Interventions:  Mobility Interventions: Patient to call before getting OOB         Medication Interventions: Patient to call before getting OOB                   Problem: Patient Education: Go to Patient Education Activity  Goal: Patient/Family Education  Outcome: Progressing Towards Goal     Problem: Pain  Goal: *Control of Pain  Outcome: Progressing Towards Goal     Problem: Patient Education: Go to Patient Education Activity  Goal: Patient/Family Education  Outcome: Progressing Towards Goal

## 2020-10-23 NOTE — LACTATION NOTE
RN in room, will return. 56 Per mom, infant latching and nursing well on L side. Baby being circumcised right now--discussed what to expect with feedings post procedure. Will page. 0 Per mom, baby hasn't eaten since before circumcision. Encouraged to unwrap infant before feeds to assist with sleepiness. Mom currently eating lunch, will attempt after. 26 Baby in nursery getting hearing screen. Mom to feed when infant returns. [place] and nursing well.

## 2020-10-23 NOTE — PROGRESS NOTES
0740- Bedside and Verbal shift change report given to Kerri Rosado RN (oncoming nurse) by Kamilla Duncan RN (offgoing nurse). Report included the following information SBAR, Intake/Output, MAR and Recent Results. 1930 Bedside and Verbal shift change report given to Julienne Rosario RN (oncoming nurse) by Kerri Rosado RN (offgoing nurse). Report included the following information SBAR, Kardex, Intake/Output, MAR and Recent Results.

## 2020-10-23 NOTE — PROGRESS NOTES
Post-Operative  Day 1    Reesa Clarity  [id number]      Information for the patient's :  Sonja Nipper [[id number]]   , Low Transverse    Patient doing well without significant complaint. Tolerating diet, yes flatus, vital removed. Patient is breastfeeding. Lochia reportedly normal.    Vitals:  Visit Vitals  /76 (BP 1 Location: Left arm, BP Patient Position: At rest)   Pulse 89   Temp 98.4 °F (36.9 °C)   Resp 17   Ht 5' 3.5\" (1.613 m)   Wt 95.3 kg (210 lb)   SpO2 99%   Breastfeeding Unknown   BMI 36.62 kg/m²     Temp (24hrs), Av °F (36.7 °C), Min:97.5 °F (36.4 °C), Max:98.4 °F (36.9 °C)      Last 24hr Input/Output:    Intake/Output Summary (Last 24 hours) at 10/23/2020 1103  Last data filed at 10/23/2020 0217  Gross per 24 hour   Intake 1700 ml   Output 3845 ml   Net -2145 ml        Exam:    Patient without distress. Lungs clear. Abdomen, bowel sounds present, soft, expected tenderness, fundus firm 0   Wound dressing intact. Labs:   Recent Results (from the past 24 hour(s))   HEMOGLOBIN    Collection Time: 10/23/20  2:10 AM   Result Value Ref Range    HGB 11.4 (L) 12.0 - 16.0 g/dL   HEMATOCRIT    Collection Time: 10/23/20  2:10 AM   Result Value Ref Range    HCT 34.9 (L) 35.0 - 45.0 %     Assessment: Post-Op day 1, stable. Plan:   1. Routine post-operative care as per  day one orders. 2. Plan for discharge home tomorrow with follow-up at 6 weeks.       Cherelle Hill CNM  10/23/2020  11:03 AM

## 2020-10-24 PROCEDURE — 74011250637 HC RX REV CODE- 250/637: Performed by: OBSTETRICS & GYNECOLOGY

## 2020-10-24 PROCEDURE — 65270000029 HC RM PRIVATE

## 2020-10-24 PROCEDURE — 74011250636 HC RX REV CODE- 250/636: Performed by: OBSTETRICS & GYNECOLOGY

## 2020-10-24 RX ORDER — IBUPROFEN 400 MG/1
800 TABLET ORAL
Status: DISCONTINUED | OUTPATIENT
Start: 2020-10-24 | End: 2020-10-25 | Stop reason: HOSPADM

## 2020-10-24 RX ADMIN — OXYCODONE HYDROCHLORIDE AND ACETAMINOPHEN 2 TABLET: 5; 325 TABLET ORAL at 14:24

## 2020-10-24 RX ADMIN — KETOROLAC TROMETHAMINE 30 MG: 30 INJECTION, SOLUTION INTRAMUSCULAR at 02:01

## 2020-10-24 RX ADMIN — IBUPROFEN 800 MG: 400 TABLET ORAL at 08:23

## 2020-10-24 RX ADMIN — OXYCODONE HYDROCHLORIDE AND ACETAMINOPHEN 2 TABLET: 5; 325 TABLET ORAL at 18:32

## 2020-10-24 NOTE — DISCHARGE SUMMARY
Obstetrical Discharge Summary     Name: Ethan Johns MRN: [id number]  SSN: [id number]    YOB: 1985  Age: 30 y.o. Sex: female      Allergies: Patient has no known allergies. Admit Date: 10/22/2020    Discharge Date: 10/25/20    Admitting Physician: Shilpa Foley MD     Attending Physician:  Angelica Fontenot MD     * Admission Diagnoses: Pregnancy [Z34.90]  IUP (intrauterine pregnancy), incidental [Z33.1]  38 weeks gestation of pregnancy [Z3A.38]    * Discharge Diagnoses:   Information for the patient's :  Himanshu Sahu [[id number]]   Delivery of a 3.425 kg male infant via , Low Transverse on 10/22/2020 at 1:20 PM  by Shilpa Foley. Apgars were 7  and 8 . Additional Diagnoses:   Hospital Problems as of 10/24/2020 Date Reviewed: 10/22/2020          Codes Class Noted - Resolved POA    Pregnancy ICD-10-CM: Z34.90  ICD-9-CM: V22.2  10/22/2020 - Present Unknown        38 weeks gestation of pregnancy ICD-10-CM: Z3A.38  ICD-9-CM: V22.2  10/22/2020 - Present Unknown        IUP (intrauterine pregnancy), incidental ICD-10-CM: Z33.1  ICD-9-CM: V22.2  10/21/2020 - Present Unknown             Lab Results   Component Value Date/Time    ABO/Rh(D) B POSITIVE 10/22/2020 08:45 AM    Rubella, External Immune 2020    GrBStrep, External neg 10/02/2020    ABO,Rh B+ 2020    There is no immunization history for the selected administration types on file for this patient. * Procedures:   Procedure(s):   SECTION           * Discharge Condition: stable    * Hospital Course: Normal hospital course following the delivery. * Disposition: Home    Discharge Medications:   Current Discharge Medication List      START taking these medications    Details   ibuprofen (MOTRIN) 800 mg tablet Take 1 Tab by mouth every eight (8) hours as needed for Pain.  Indications: pain  Qty: 60 Tab, Refills: 1      oxyCODONE-acetaminophen (PERCOCET) 5-325 mg per tablet Take 1-2 Tabs by mouth every six (6) hours as needed for Pain for up to 3 days. Max Daily Amount: 8 Tabs. Qty: 20 Tab, Refills: 0    Associated Diagnoses: S/P repeat low transverse          CONTINUE these medications which have NOT CHANGED    Details   PNV Comb #2-Iron-FA-Omega 3 29-1-400 mg cmpk Take 1 Tab by mouth daily. Indications: pregnancy      cholecalciferol (Vitamin D3) (1000 Units /25 mcg) tablet Take 1,000 Units by mouth daily. Indications: low vitamin D levels      ferrous sulfate (Iron) 325 mg (65 mg iron) tablet Take 325 mg by mouth Daily (before breakfast). Indications: anemia from inadequate iron             * Follow-up Care/Patient Instructions: Activity: Activity as tolerated and no driving for today, Ambulate in house, No sex for 6 weeks, No driving while on analgesics and No heavy lifting for 6weeks  Diet: Regular Diet  Wound Care: Keep wound clean and dry    Follow-up Information    None                     .

## 2020-10-24 NOTE — PROGRESS NOTES
Assumed care of pt.  0735-assessment completed. Dressing intact. Pt is to take shower to loosen dressing. 0823-INT d/c'd.pain med given. Denies any other needs. 0920-pain med effective. 1000-awake in bed. Encourage to feed infant. 1200-awake in bed. Infant placed skin to skin. 1300-eating lunch. Denies needs. 1424-sitting up in chair. Pain med given. 1525-reassessment completed. Pain med effective. 1550-resting in bed. Denies needs. 1715-resting in bed. Denies needs. 1832-eating. C/o pain- med given. 1925-Bedside and Verbal shift change report given to ADRIAN Neil RN  (oncoming nurse) by BELEM Lopez LPN (offgoing nurse). Report given with SBAR, Kardex, Intake/Output, MAR and Recent Results.

## 2020-10-24 NOTE — PROGRESS NOTES
Post-Operative  Day 2    Gene Grimm     Assessment:   Hospital Problems  Date Reviewed: 10/22/2020          Codes Class Noted POA    Pregnancy ICD-10-CM: Z34.90  ICD-9-CM: V22.2  10/22/2020 Unknown        38 weeks gestation of pregnancy ICD-10-CM: Z3A.38  ICD-9-CM: V22.2  10/22/2020 Unknown        IUP (intrauterine pregnancy), incidental ICD-10-CM: Z33.1  ICD-9-CM: V22.2  10/21/2020 Unknown            Post-Op day 2, doing well    Plan:   1. Routine post-operative care  2. Discharge home tomorrow on percocet, ibuprofen and PNV daily     Information for the patient's :  Saul Samson [[id number]]   , Low Transverse    Patient doing well without significant complaint. Nausea and vomiting resolved, tolerating liquids, passing flatus, voiding and ambulating without difficulty. No BM yet. Breast feeding with no concerns. Pain well controlled with oral pain meds. Desire day 3 discharge. Current Facility-Administered Medications   Medication Dose Route Frequency    lactated Ringers infusion  125 mL/hr IntraVENous CONTINUOUS    lactated Ringers infusion  125 mL/hr IntraVENous CONTINUOUS    sodium chloride (NS) flush 5-40 mL  5-40 mL IntraVENous Q8H    sodium chloride (NS) flush 5-40 mL  5-40 mL IntraVENous Q8H    HYDROmorphone (DILAUDID) 30 mg / 30 mL PCA in water   IntraVENous TITRATE       Vitals:  Visit Vitals  BP (P) 130/71 (BP 1 Location: Right arm, BP Patient Position: At rest)   Pulse (!) (P) 103   Temp 97.9 °F (36.6 °C)   Resp (P) 16   Ht 5' 3.5\" (1.613 m)   Wt 95.3 kg (210 lb)   SpO2 (P) 98%   Breastfeeding Unknown   BMI 36.62 kg/m²     Temp (24hrs), Av.4 °F (36.9 °C), Min:97.9 °F (36.6 °C), Max:98.7 °F (37.1 °C)        Exam:        Patient without distress. Abdomen, bowel sounds present, soft, expected tenderness, fundus firm                Wound incision dressing clean, dry and intact with no evidence of drainage.                 Lower extremities are negative for swelling, cords or tenderness. Labs:   Lab Results   Component Value Date/Time    WBC 13.9 (H) 10/22/2020 08:45 AM    WBC 13.6 (H) 10/21/2020 10:10 PM    HGB 11.4 (L) 10/23/2020 02:10 AM    HGB 12.1 10/22/2020 08:45 AM    HGB 12.1 10/21/2020 10:10 PM    HCT 34.9 (L) 10/23/2020 02:10 AM    HCT 36.8 10/22/2020 08:45 AM    HCT 36.7 10/21/2020 10:10 PM    PLATELET 131 88/44/6165 08:45 AM    PLATELET 935 76/29/4279 10:10 PM       No results found for this or any previous visit (from the past 24 hour(s)).

## 2020-10-24 NOTE — PROGRESS NOTES
Problem: Patient Education: Go to Patient Education Activity  Goal: Patient/Family Education  Outcome: Progressing Towards Goal     Problem:  Delivery: Day of Delivery  Goal: Off Pathway (Use only if patient is Off Pathway)  Outcome: Progressing Towards Goal  Goal: Activity/Safety  Outcome: Progressing Towards Goal  Goal: Consults, if ordered  Outcome: Progressing Towards Goal  Goal: Diagnostic Test/Procedures  Outcome: Progressing Towards Goal  Goal: Nutrition/Diet  Outcome: Progressing Towards Goal  Goal: Discharge Planning  Outcome: Progressing Towards Goal  Goal: Medications  Outcome: Progressing Towards Goal  Goal: Respiratory  Outcome: Progressing Towards Goal  Goal: Treatments/Interventions/Procedures  Outcome: Progressing Towards Goal  Goal: Psychosocial  Outcome: Progressing Towards Goal  Goal: *Vital signs within defined limits  Outcome: Progressing Towards Goal  Goal: *Labs within defined limits  Outcome: Progressing Towards Goal  Goal: *Hemodynamically stable  Outcome: Progressing Towards Goal  Goal: *Optimal pain control at patient's stated goal  Outcome: Progressing Towards Goal  Goal: *Participates in infant care  Outcome: Progressing Towards Goal  Goal: *Demonstrates progressive activity  Outcome: Progressing Towards Goal  Goal: *Tolerating diet  Outcome: Progressing Towards Goal     Problem: Pain  Goal: *Control of Pain  Outcome: Progressing Towards Goal     Problem: Patient Education: Go to Patient Education Activity  Goal: Patient/Family Education  Outcome: Progressing Towards Goal     Problem: Falls - Risk of  Goal: *Absence of Falls  Description: Document Maritza Fall Risk and appropriate interventions in the flowsheet.   Outcome: Progressing Towards Goal  Note: Fall Risk Interventions:  Mobility Interventions: Patient to call before getting OOB         Medication Interventions: Teach patient to arise slowly                   Problem: Patient Education: Go to Patient Education Activity  Goal: Patient/Family Education  Outcome: Progressing Towards Goal     Problem: Pain  Goal: *Control of Pain  Outcome: Progressing Towards Goal     Problem: Patient Education: Go to Patient Education Activity  Goal: Patient/Family Education  Outcome: Progressing Towards Goal     Problem: Patient Education: Go to Patient Education Activity  Goal: Patient/Family Education  Outcome: Progressing Towards Goal     Problem:  Delivery: Postpartum Day 1  Goal: Activity/Safety  Outcome: Progressing Towards Goal  Goal: Consults, if ordered  Outcome: Progressing Towards Goal  Goal: Diagnostic Test/Procedures  Outcome: Progressing Towards Goal  Goal: Nutrition/Diet  Outcome: Progressing Towards Goal  Goal: Discharge Planning  Outcome: Progressing Towards Goal  Goal: Medications  Outcome: Progressing Towards Goal  Goal: Respiratory  Outcome: Progressing Towards Goal  Goal: Treatments/Interventions/Procedures  Outcome: Progressing Towards Goal  Goal: Psychosocial  Outcome: Progressing Towards Goal  Goal: *Vital signs within defined limits  Outcome: Progressing Towards Goal  Goal: *Labs within defined limits  Outcome: Progressing Towards Goal  Goal: *Hemodynamically stable  Outcome: Progressing Towards Goal  Goal: *Optimal pain control at patient's stated goal  Outcome: Progressing Towards Goal  Goal: *Participates in infant care  Outcome: Progressing Towards Goal  Goal: *Demonstrates progressive activity  Outcome: Progressing Towards Goal  Goal: *Tolerating diet  Outcome: Progressing Towards Goal  Goal: *Performs self perineal care  Outcome: Progressing Towards Goal

## 2020-10-25 VITALS
WEIGHT: 210 LBS | OXYGEN SATURATION: 99 % | TEMPERATURE: 98.2 F | RESPIRATION RATE: 18 BRPM | HEART RATE: 84 BPM | SYSTOLIC BLOOD PRESSURE: 130 MMHG | DIASTOLIC BLOOD PRESSURE: 83 MMHG | BODY MASS INDEX: 35.85 KG/M2 | HEIGHT: 64 IN

## 2020-10-25 PROCEDURE — 74011250637 HC RX REV CODE- 250/637: Performed by: OBSTETRICS & GYNECOLOGY

## 2020-10-25 RX ADMIN — OXYCODONE HYDROCHLORIDE AND ACETAMINOPHEN 2 TABLET: 5; 325 TABLET ORAL at 09:08

## 2020-10-25 RX ADMIN — OXYCODONE HYDROCHLORIDE AND ACETAMINOPHEN 1 TABLET: 5; 325 TABLET ORAL at 14:23

## 2020-10-25 RX ADMIN — IBUPROFEN 800 MG: 400 TABLET ORAL at 14:23

## 2020-10-25 RX ADMIN — Medication 80 MG: at 01:23

## 2020-10-25 NOTE — DISCHARGE INSTRUCTIONS
POST DELIVERY DISCHARGE INSTRUCTIONS    Name: Grace Hawley  YOB: 1985  Primary Diagnosis: Active Problems:    IUP (intrauterine pregnancy), incidental (10/21/2020)      Pregnancy (10/22/2020)      38 weeks gestation of pregnancy (10/22/2020)        General:     Diet/Diet Restrictions:  Eight 8-ounce glasses of fluid daily (water, juices); avoid excessive caffeine intake. Meals/snacks as desired which are high in fiber and carbohydrates and low in fat and cholesterol. Physical Activity / Restrictions / Safety:     Avoid heavy lifting, no more that 8 lbs. For 2-3 weeks; limit use of stairs to 2 times daily for the first week home. No driving for one week. Avoid intercourse 4-6 weeks, no douching or tampon use. Check with obstetrician before starting or resuming an exercise program.         Discharge Instructions/Special Treatment/Home Care Needs:     Continue prenatal vitamins. Continue to use squirt bottle with warm water on your episiotomy after each bathroom use until bleeding stops. Call your doctor for the following:     Fever over 100.4 degrees by mouth. Vaginal bleeding heavier than a normal menstrual period or clot larger than a golf ball. Red streaks or increased swelling of legs, painful red streaks on your breast.  Painful urination, constipation and increased pain or swelling or discharge with your incision. If you feel extremely anxious or overwhelmed. If you have thoughts of harming yourself and/or your baby. Pain Management:     Pain Management:   Take Acetaminophen (Tylenol) or Ibuprofen (Advil, Motrin), as directed for pain. Use a warm Sitz bath 3 times daily to relieve episiotomy or hemorrhoidal discomfort. Heating pad to  incision as needed. For hemorrhoidal discomfort, use Tucks and Anusol cream as needed and directed. Follow-Up Care:      These are general instructions for a healthy lifestyle:    No smoking/ No tobacco products/ Avoid exposure to second hand smoke    Surgeon General's Warning:  Quitting smoking now greatly reduces serious risk to your health. Obesity, smoking, and sedentary lifestyle greatly increases your risk for illness    A healthy diet, regular physical exercise & weight monitoring are important for maintaining a healthy lifestyle    Recognize signs and symptoms of STROKE:    F-face looks uneven    A-arms unable to move or move unevenly    S-speech slurred or non-existent    T-time-call 911 as soon as signs and symptoms begin-DO NOT go       Back to bed or wait to see if you get better-TIME IS BRAIN. Patient armband removed and given to patient to take home.   Patient was informed of the privacy risks if armband lost or stolen

## 2020-10-25 NOTE — PROGRESS NOTES
Problem: Patient Education: Go to Patient Education Activity  Goal: Patient/Family Education  Outcome: Resolved/Met     Problem:  Delivery: Day of Delivery  Goal: Off Pathway (Use only if patient is Off Pathway)  Outcome: Resolved/Met  Goal: Activity/Safety  Outcome: Resolved/Met  Goal: Consults, if ordered  Outcome: Resolved/Met  Goal: Diagnostic Test/Procedures  Outcome: Resolved/Met  Goal: Nutrition/Diet  Outcome: Resolved/Met  Goal: Discharge Planning  Outcome: Resolved/Met  Goal: Medications  Outcome: Resolved/Met  Goal: Respiratory  Outcome: Resolved/Met  Goal: Treatments/Interventions/Procedures  Outcome: Resolved/Met  Goal: Psychosocial  Outcome: Resolved/Met  Goal: *Vital signs within defined limits  Outcome: Resolved/Met  Goal: *Labs within defined limits  Outcome: Resolved/Met  Goal: *Hemodynamically stable  Outcome: Resolved/Met  Goal: *Optimal pain control at patient's stated goal  Outcome: Resolved/Met  Goal: *Participates in infant care  Outcome: Resolved/Met  Goal: *Demonstrates progressive activity  Outcome: Resolved/Met  Goal: *Tolerating diet  Outcome: Resolved/Met     Problem: Pain  Goal: *Control of Pain  Outcome: Resolved/Met     Problem: Patient Education: Go to Patient Education Activity  Goal: Patient/Family Education  Outcome: Resolved/Met     Problem: Falls - Risk of  Goal: *Absence of Falls  Description: Document Maritza Fall Risk and appropriate interventions in the flowsheet.   Outcome: Resolved/Met     Problem: Patient Education: Go to Patient Education Activity  Goal: Patient/Family Education  Outcome: Resolved/Met     Problem: Pain  Goal: *Control of Pain  Outcome: Resolved/Met     Problem: Patient Education: Go to Patient Education Activity  Goal: Patient/Family Education  Outcome: Resolved/Met     Problem: Patient Education: Go to Patient Education Activity  Goal: Patient/Family Education  Outcome: Resolved/Met     Problem:  Delivery: Postpartum Day 1  Goal: Activity/Safety  Outcome: Resolved/Met  Goal: Consults, if ordered  Outcome: Resolved/Met  Goal: Diagnostic Test/Procedures  Outcome: Resolved/Met  Goal: Nutrition/Diet  Outcome: Resolved/Met  Goal: Discharge Planning  Outcome: Resolved/Met  Goal: Medications  Outcome: Resolved/Met  Goal: Respiratory  Outcome: Resolved/Met  Goal: Treatments/Interventions/Procedures  Outcome: Resolved/Met  Goal: Psychosocial  Outcome: Resolved/Met  Goal: *Vital signs within defined limits  Outcome: Resolved/Met  Goal: *Labs within defined limits  Outcome: Resolved/Met  Goal: *Hemodynamically stable  Outcome: Resolved/Met  Goal: *Optimal pain control at patient's stated goal  Outcome: Resolved/Met  Goal: *Participates in infant care  Outcome: Resolved/Met  Goal: *Demonstrates progressive activity  Outcome: Resolved/Met  Goal: *Tolerating diet  Outcome: Resolved/Met  Goal: *Performs self perineal care  Outcome: Resolved/Met     Problem:  Delivery: Postpartum Day 2  Goal: Activity/Safety  Outcome: Resolved/Met  Goal: Consults, if ordered  Outcome: Resolved/Met  Goal: Nutrition/Diet  Outcome: Resolved/Met  Goal: Discharge Planning  Outcome: Resolved/Met  Goal: Medications  Outcome: Resolved/Met  Goal: Treatments/Interventions/Procedures  Outcome: Resolved/Met  Goal: Psychosocial  Outcome: Resolved/Met  Goal: *Vital signs within defined limits  Outcome: Resolved/Met  Goal: *Labs within defined limits  Outcome: Resolved/Met  Goal: *Hemodynamically stable  Outcome: Resolved/Met  Goal: *Optimal pain control at patient's stated goal  Outcome: Resolved/Met  Goal: *Participates in infant care  Outcome: Resolved/Met  Goal: *Demonstrates progressive activity  Outcome: Resolved/Met  Goal: *Appropriate parent-infant bonding  Outcome: Resolved/Met  Goal: *Tolerating diet  Outcome: Resolved/Met

## 2020-10-25 NOTE — PROGRESS NOTES
0720 - Bedside and Verbal shift change report given to BELEM Lopez LPN by Ash Dalal RN. Report included the following information SBAR, Kardex, OR Summary, Intake/Output and MAR. Derm MA-call the patient. We reviewed the pathology and surgical notes for Dr. vergara. She needs follow-up in our clinic for history of melanoma in August of 2019. Please have her scheduled follow-up.

## 2020-10-25 NOTE — PROGRESS NOTES
Problem: Pain  Goal: *Control of Pain  Outcome: Progressing Towards Goal     Problem: Falls - Risk of  Goal: *Absence of Falls  Description: Document Maritza Fall Risk and appropriate interventions in the flowsheet.   Outcome: Progressing Towards Goal  Note: Fall Risk Interventions:  Mobility Interventions: Patient to call before getting OOB         Medication Interventions: Teach patient to arise slowly                   Problem:  Delivery: Postpartum Day 2  Goal: Activity/Safety  Outcome: Progressing Towards Goal  Goal: Nutrition/Diet  Outcome: Progressing Towards Goal  Goal: Discharge Planning  Outcome: Progressing Towards Goal  Goal: Medications  Outcome: Progressing Towards Goal  Goal: Treatments/Interventions/Procedures  Outcome: Progressing Towards Goal  Goal: Psychosocial  Outcome: Progressing Towards Goal  Goal: *Vital signs within defined limits  Outcome: Progressing Towards Goal  Goal: *Labs within defined limits  Outcome: Progressing Towards Goal  Goal: *Hemodynamically stable  Outcome: Progressing Towards Goal  Goal: *Optimal pain control at patient's stated goal  Outcome: Progressing Towards Goal  Goal: *Participates in infant care  Outcome: Progressing Towards Goal  Goal: *Demonstrates progressive activity  Outcome: Progressing Towards Goal  Goal: *Appropriate parent-infant bonding  Outcome: Progressing Towards Goal  Goal: *Tolerating diet  Outcome: Progressing Towards Goal

## 2020-10-25 NOTE — PROGRESS NOTES
Assumed care of pt.  0908-assessment completed. Pain med given. Denies any other needs. 1010-resting in bed. Pain med effective. 1150-skin to skin. 1230-feeding infant. Denies needs. 1335-awake in bed. Denies needs. 1425-discharge instructions reviewed with pt who verbalized understanding and signed. VSS. Reassessment completed. 1520-pain med effective. Denies needs. 1615-up in room. Going to bathroom then will be ready for discharge. 1621-discharged home with infant via wheelchair.

## 2020-10-28 ENCOUNTER — ANESTHESIA (OUTPATIENT)
Dept: LABOR AND DELIVERY | Age: 35
End: 2020-10-28

## (undated) DEVICE — HEX-LOCKING BLADE ELECTRODE: Brand: EDGE

## (undated) DEVICE — SUTURE MCRYL SZ 1 L36IN ABSRB UD L36MM CT-1 1/2 CIR Y947H

## (undated) DEVICE — INTENDED FOR TISSUE SEPARATION, AND OTHER PROCEDURES THAT REQUIRE A SHARP SURGICAL BLADE TO PUNCTURE OR CUT.: Brand: BARD-PARKER ® CARBON RIB-BACK BLADES

## (undated) DEVICE — STRAP,POSITIONING,KNEE/BODY,FOAM,4X60": Brand: MEDLINE

## (undated) DEVICE — PACK PROCEDURE SURG BIRTH

## (undated) DEVICE — SOL IRRIGATION INJ NACL 0.9% 500ML BTL

## (undated) DEVICE — GARMENT,MEDLINE,DVT,INT,CALF,MED, GEN2: Brand: MEDLINE

## (undated) DEVICE — 3L THIN WALL CAN: Brand: CRD

## (undated) DEVICE — SUTURE VCRL SZ 3-0 L27IN ABSRB UD KS L60MM STR REV CUT NDL J663H

## (undated) DEVICE — LARGE, DISPOSABLE ALEXIS O C-SECTION PROTECTOR - RETRACTOR: Brand: ALEXIS ® O C-SECTION PROTECTOR - RETRACTOR

## (undated) DEVICE — REM POLYHESIVE ADULT PATIENT RETURN ELECTRODE: Brand: VALLEYLAB

## (undated) DEVICE — STERILE POLYISOPRENE POWDER-FREE SURGICAL GLOVES: Brand: PROTEXIS

## (undated) DEVICE — SUTURE VCRL SZ 0 L36IN ABSRB VLT L40MM CT 1/2 CIR J358H

## (undated) DEVICE — SUT VCRL + 0 36IN CT1 UD --

## (undated) DEVICE — SUTURE MCRYL SZ 1 L36IN ABSRB VLT CT L40MM 1/2 CIR TAPR PNT Y359H

## (undated) DEVICE — SUTURE VCRL + SZ 3-0 L36IN ABSRB UD L36MM CT-1 1/2 CIR VCP944H